# Patient Record
(demographics unavailable — no encounter records)

---

## 2024-12-24 NOTE — PHYSICAL EXAM
[NL (0)] : extension 0 degrees [5___] : hamstring 5[unfilled]/5 [Negative] : negative Jay's [] : mildly antalgic [Right] : right knee [AP] : anteroposterior [Lateral] : lateral [Loose body] : Loose body [FreeTextEntry8] : minimal [TWNoteComboBox7] : flexion 125 degrees

## 2024-12-24 NOTE — HISTORY OF PRESENT ILLNESS
[5] : 5 [3] : 3 [Dull/Aching] : dull/aching [Occasional] : occasional [Meds] : meds [de-identified] : 12/24/2024 Has been losing weight which has helped knee, also PT has been of help, would like to do more 03/08/2024 Doing PT, would like to continue as it has been helpful 01/19/2024 Right knee pain returned, did well with CSI and PT in the past 11/7/22- Right knee pain much improved with injection 10-10-22- History is translated through his son. States 2 + week history of ant/medially based right knee pain. denies injury, notes pain is worse with ambulation and does admit to buckling episodes. He has tried otc nsaids with some help  works in scrap metal  [] : Post Surgical Visit: no [FreeTextEntry1] : RT knee  [FreeTextEntry3] : about 2 weeks  [FreeTextEntry5] : patient is feeling pain when he id active and walking  [FreeTextEntry9] : advil  [de-identified] : raising leg

## 2025-07-16 NOTE — PHYSICAL EXAM
[Well Nourished] : well nourished [No Acute Distress] : no acute distress [No Carotid Bruit] : no carotid bruit [Normal S1, S2] : normal S1, S2 [No Rub] : no rub [No Gallop] : no gallop [Bradycardia] : bradycardic [Rhythm Regular] : regular [II] : a grade 2 [Clear Lung Fields] : clear lung fields [Good Air Entry] : good air entry [No Edema] : no edema [No Cyanosis] : no cyanosis [No Clubbing] : no clubbing [Normal Radial B/L] : normal radial B/L [Moves all extremities] : moves all extremities [Normal Speech] : normal speech [Normal memory] : normal memory

## 2025-07-16 NOTE — CARDIOLOGY SUMMARY
[de-identified] : ECG sinus franklin 51 bpm, otherwise normal. QTc 415 ms. ECG 6/18/2025 SB, T wave inversions inferiorly, consider ischemia

## 2025-07-16 NOTE — REASON FOR VISIT
[Structural Heart and Valve Disease] : structural heart and valve disease [Family Member] : family member [Patient Declined  Services] : - None: Patient declined  services [Time Spent: ____ minutes] : Total time spent using  services: [unfilled] minutes. The patient's primary language is not English thus required  services. [FreeTextEntry3] : Geena Valdez DO [Interpreters_Relationshiptopatient] : Son [TWNoteComboBox1] : [place] [FreeTextEntry1] : History was provided by patient, patient's son and chart review.

## 2025-07-16 NOTE — DISCUSSION/SUMMARY
[EKG obtained to assist in diagnosis and management of assessed problem(s)] : EKG obtained to assist in diagnosis and management of assessed problem(s) [FreeTextEntry1] : Mr. Tiburcio Quintana is a 55-year-old man.    1. Diastolic murmur. He denies angina or shortness of breath. There is no evidence of HF on exam. There is a grade 2/6 diastolic murmur along the right upper sternal border that accentuates with expiration. This is most suggestive of aortic valve regurgitation. TTE is indicated and patient agreed.  2. Cardiac risk factor stratification per PCP.  Will request labs from PCP office re: lipids and A1c.  3. Sinus bradycardia. Asymptomatic. Monitor. Will request labs from PCP office re: TSH.  4. HTN. Unclear 10-year ASCVD risk. Need lipids from PCP office.  I recommended lifestyle modifications for long-term cardiovascular health. I reviewed cardiac alarm symptoms with the patient and next steps if the patient experiences such symptoms.   I recommend a follow-up appointment with me in 3 months. I will follow up on the echo. I emphasized the importance of follow-up care as it can significantly impact health outcomes. Failure to follow through with recommended care can lead to complications and increased risk of adverse events.   The patient agreed with the above recommendations and was appreciative of the care provided. The patient should continue seeing their primary care physician for overall health maintenance. I am happy to answer any further questions so please call my office if needed. Thank you for the opportunity to participate in the care of Mr. Quintana.     Rajinder Fox DO, [facility] Adult Congenital Heart Disease Cardiologist Cardio-Obstetrics Cardiologist [address], [place] [address] Office telephone number [phone]

## 2025-07-16 NOTE — HISTORY OF PRESENT ILLNESS
[FreeTextEntry1] : Mr. Tiburcio Quintana is a 55-year-old man with no known cardiac disease and recent rash on his lower extremities. He was referred specifically by his PCP for "leaky valve."  He denies near syncope, syncope, chest pain, palpitations or edema. He has never been told that he has a heart murmur. Patient reports no knowledge of premature CAD, SCD, device therapies or congenital heart disease in the family. Patient denies smoking, drinking alcohol or using illicit drugs. 2 children.  CV meds: none Exercise: active at baseline, no formal exercise